# Patient Record
Sex: FEMALE | Race: WHITE | Employment: OTHER | URBAN - METROPOLITAN AREA
[De-identification: names, ages, dates, MRNs, and addresses within clinical notes are randomized per-mention and may not be internally consistent; named-entity substitution may affect disease eponyms.]

---

## 2018-09-18 RX ORDER — TROPICAMIDE 10 MG/ML
1 SOLUTION/ DROPS OPHTHALMIC
Status: CANCELLED | OUTPATIENT
Start: 2018-09-18 | End: 2018-09-18

## 2018-09-18 RX ORDER — ATROPINE SULFATE 10 MG/ML
1 SOLUTION/ DROPS OPHTHALMIC
Status: CANCELLED | OUTPATIENT
Start: 2018-09-18 | End: 2018-09-18

## 2018-09-18 RX ORDER — PHENYLEPHRINE HYDROCHLORIDE 25 MG/ML
1 SOLUTION/ DROPS OPHTHALMIC
Status: CANCELLED | OUTPATIENT
Start: 2018-09-18 | End: 2018-09-18

## 2018-09-24 ENCOUNTER — HOSPITAL ENCOUNTER (OUTPATIENT)
Dept: SURGERY | Age: 78
Discharge: HOME | End: 2018-09-24
Payer: MEDICARE

## 2018-09-24 VITALS
TEMPERATURE: 98.8 F | HEART RATE: 64 BPM | WEIGHT: 173.56 LBS | SYSTOLIC BLOOD PRESSURE: 135 MMHG | OXYGEN SATURATION: 97 % | BODY MASS INDEX: 29.63 KG/M2 | DIASTOLIC BLOOD PRESSURE: 65 MMHG | HEIGHT: 64 IN

## 2018-09-24 LAB — POTASSIUM SERPL-SCNC: 4.5 MMOL/L (ref 3.5–5.1)

## 2018-09-24 PROCEDURE — 84132 ASSAY OF SERUM POTASSIUM: CPT

## 2018-09-24 RX ORDER — LISINOPRIL 20 MG/1
TABLET ORAL DAILY
COMMUNITY

## 2018-09-24 RX ORDER — VERAPAMIL HYDROCHLORIDE 240 MG/1
TABLET, FILM COATED, EXTENDED RELEASE ORAL
COMMUNITY

## 2018-09-24 RX ORDER — HYDROCHLOROTHIAZIDE 12.5 MG/1
12.5 TABLET ORAL DAILY
COMMUNITY

## 2018-09-24 RX ORDER — LEVOTHYROXINE SODIUM 50 UG/1
TABLET ORAL
COMMUNITY

## 2018-09-24 RX ORDER — POLYETHYLENE GLYCOL 3350 17 G/17G
17 POWDER, FOR SOLUTION ORAL DAILY
COMMUNITY

## 2018-09-24 RX ORDER — CLONIDINE HYDROCHLORIDE 0.1 MG/1
TABLET ORAL AS NEEDED
COMMUNITY

## 2018-09-24 RX ORDER — MECLIZINE HYDROCHLORIDE 25 MG/1
25 TABLET ORAL AS NEEDED
COMMUNITY

## 2018-09-24 RX ORDER — GUAIFENESIN 100 MG/5ML
81 LIQUID (ML) ORAL DAILY
COMMUNITY

## 2018-09-24 RX ORDER — OMEPRAZOLE 40 MG/1
40 CAPSULE, DELAYED RELEASE ORAL DAILY
COMMUNITY

## 2018-09-24 RX ORDER — ATORVASTATIN CALCIUM 10 MG/1
TABLET, FILM COATED ORAL DAILY
COMMUNITY

## 2018-09-24 RX ORDER — CARVEDILOL 25 MG/1
25 TABLET ORAL 2 TIMES DAILY WITH MEALS
COMMUNITY

## 2018-09-24 RX ORDER — METRONIDAZOLE 10 MG/G
GEL TOPICAL DAILY
COMMUNITY

## 2018-09-24 NOTE — PERIOP NOTES
Type 2 surgery,  assessment complete. Labs per surgeon: poc potassium 09/24/18 Labs per anesthesia protocol: hgb, poc potassium signed and held for DOS. EKG: not required Pacer rep not required per Dr. Hoffman . Cardiology records including pacer interrogation requested from [facility] cardiology. Non-moisturizing soap and instructions given per hospital policy. Patient provided with and instructed on educational handouts including Guide to Surgery, Pain Management, Hand Hygiene, Blood Transfusion Education, and [place] Anesthesia Brochure. Patient answered medical/surgical history questions at their best of ability. All prior to admission medications documented in [facility]. Patient instructed to hold all vitamins 7 days prior to surgery and NSAIDS 5 days prior to surgery, patient verbalized understanding. Medications to be held: asa - note on chart. Patient instructed to continue previous medications as prescribed prior to surgery and to take the following medications the day of surgery according to anesthesia guidelines with a small sip of water: carvedilol, levothyroxine, omeprazole. Patient instructed on the following: 
Arrive at [facility], time of arrival to be called the day before by 1700 NPO after midnight including gum, mints, and ice chips. Responsible adult must drive patient to the hospital, stay during surgery, and patient will require supervision 24 hours after anesthesia. Use non-moisturizing soap in shower the night before surgery and on the morning of surgery. Leave all valuables (money and jewelry) at home but bring insurance card and ID on       DOS. Do not wear make-up, nail polish, lotions, cologne, perfumes, powders, or oil on skin. Patient teach back successful and patient demonstrates knowledge of instruction.

## 2018-09-24 NOTE — PERIOP NOTES
Type 2 surgery,  assessment complete. Labs per surgeon: none Labs per anesthesia protocol: potassium 09/24/18, hgb and poc potassium signed and held for DOS. EKG: not required Non-moisturizing soap and instructions given per hospital policy. Patient provided with and instructed on educational handouts including Guide to Surgery, Pain Management, Hand Hygiene, Blood Transfusion Education, and [place] Anesthesia Brochure. Patient answered medical/surgical history questions at their best of ability. All prior to admission medications documented in [facility]. Patient instructed to hold all vitamins 7 days prior to surgery and NSAIDS 5 days prior to surgery, patient verbalized understanding. Medications to be held: asa per md instruction- note on chart. Patient instructed to continue previous medications as prescribed prior to surgery and to take the following medications the day of surgery according to anesthesia guidelines with a small sip of water: carvedilol, levothyroxine, omeprazole. Patient instructed on the following: 
Arrive at [facility], time of arrival to be called the day before by 1700 NPO after midnight including gum, mints, and ice chips. Responsible adult must drive patient to the hospital, stay during surgery, and patient will require supervision 24 hours after anesthesia. Use non-moisturizing soap in shower the night before surgery and on the morning of surgery. Leave all valuables (money and jewelry) at home but bring insurance card and ID on       DOS. Do not wear make-up, nail polish, lotions, cologne, perfumes, powders, or oil on skin. Patient teach back successful and patient demonstrates knowledge of instruction.

## 2018-09-25 NOTE — PERIOP NOTES
Last office note and pacer check and ekg and stress test and echo  on chart from [facility] on chart and acceptable per anesthesia protocol

## 2018-09-30 ENCOUNTER — ANESTHESIA EVENT (OUTPATIENT)
Dept: SURGERY | Age: 78
End: 2018-09-30
Payer: MEDICARE

## 2018-10-01 ENCOUNTER — ANESTHESIA (OUTPATIENT)
Dept: SURGERY | Age: 78
End: 2018-10-01
Payer: MEDICARE

## 2018-10-01 ENCOUNTER — HOSPITAL ENCOUNTER (OUTPATIENT)
Age: 78
Setting detail: OUTPATIENT SURGERY
Discharge: HOME | End: 2018-10-01
Attending: OPHTHALMOLOGY | Admitting: OPHTHALMOLOGY
Payer: MEDICARE

## 2018-10-01 VITALS
WEIGHT: 170 LBS | RESPIRATION RATE: 17 BRPM | BODY MASS INDEX: 29.18 KG/M2 | OXYGEN SATURATION: 96 % | DIASTOLIC BLOOD PRESSURE: 95 MMHG | SYSTOLIC BLOOD PRESSURE: 186 MMHG | HEART RATE: 65 BPM | TEMPERATURE: 97.9 F

## 2018-10-01 PROCEDURE — 77030006704 HC BLD OPHTH SLT ALCN -B: Performed by: OPHTHALMOLOGY

## 2018-10-01 PROCEDURE — 77030026083 HC FCPS OPHTH GRSH DSP ALCN -C: Performed by: OPHTHALMOLOGY

## 2018-10-01 PROCEDURE — 77030011291 HC ERAS HEMSTAT BVTC -A: Performed by: OPHTHALMOLOGY

## 2018-10-01 PROCEDURE — 74011000250 HC RX REV CODE- 250: Performed by: OPHTHALMOLOGY

## 2018-10-01 PROCEDURE — 77030002975 HC SUT PLN J&J -B: Performed by: OPHTHALMOLOGY

## 2018-10-01 PROCEDURE — 74011250636 HC RX REV CODE- 250/636: Performed by: ANESTHESIOLOGY

## 2018-10-01 PROCEDURE — 74011250636 HC RX REV CODE- 250/636: Performed by: OPHTHALMOLOGY

## 2018-10-01 PROCEDURE — 77030018846 HC SOL IRR STRL H20 ICUM -A: Performed by: OPHTHALMOLOGY

## 2018-10-01 PROCEDURE — 77030020272 HC MISC IMPL LENS: Performed by: OPHTHALMOLOGY

## 2018-10-01 PROCEDURE — 76010000131 HC OR TIME 2 TO 2.5 HR: Performed by: OPHTHALMOLOGY

## 2018-10-01 PROCEDURE — 74011250636 HC RX REV CODE- 250/636

## 2018-10-01 PROCEDURE — 77030018838 HC SOL IRR OPTH ALCN -B: Performed by: OPHTHALMOLOGY

## 2018-10-01 PROCEDURE — 77030008547 HC TBNG OPHTH BD -A: Performed by: OPHTHALMOLOGY

## 2018-10-01 PROCEDURE — 77030029764 HC VLV ENTRY OPHTH SYS ALCN -C: Performed by: OPHTHALMOLOGY

## 2018-10-01 PROCEDURE — 77030013673 HC FCPS OPTH ALCN -C: Performed by: OPHTHALMOLOGY

## 2018-10-01 PROCEDURE — 76210000020 HC REC RM PH II FIRST 0.5 HR: Performed by: OPHTHALMOLOGY

## 2018-10-01 PROCEDURE — 77030026916 HC ERAS HEMSTAT BVTC -B: Performed by: OPHTHALMOLOGY

## 2018-10-01 PROCEDURE — 77030006694 HC BLD OPHTH CRESC ALCN -B: Performed by: OPHTHALMOLOGY

## 2018-10-01 PROCEDURE — 77030002917 HC SUT ETHLN J&J -B: Performed by: OPHTHALMOLOGY

## 2018-10-01 PROCEDURE — 76210000063 HC OR PH I REC FIRST 0.5 HR: Performed by: OPHTHALMOLOGY

## 2018-10-01 PROCEDURE — 77030030827 HC RETRCTR IRIS DISP ALCN -C: Performed by: OPHTHALMOLOGY

## 2018-10-01 PROCEDURE — 77030003029 HC SUT VCRL J&J -B: Performed by: OPHTHALMOLOGY

## 2018-10-01 PROCEDURE — 76060000036 HC ANESTHESIA 2.5 TO 3 HR: Performed by: OPHTHALMOLOGY

## 2018-10-01 RX ORDER — SODIUM CHLORIDE, SODIUM LACTATE, POTASSIUM CHLORIDE, CALCIUM CHLORIDE 600; 310; 30; 20 MG/100ML; MG/100ML; MG/100ML; MG/100ML
100 INJECTION, SOLUTION INTRAVENOUS CONTINUOUS
Status: DISCONTINUED | OUTPATIENT
Start: 2018-10-01 | End: 2018-10-01 | Stop reason: HOSPADM

## 2018-10-01 RX ORDER — SODIUM CHLORIDE 9 MG/ML
INJECTION INTRAMUSCULAR; INTRAVENOUS; SUBCUTANEOUS AS NEEDED
Status: DISCONTINUED | OUTPATIENT
Start: 2018-10-01 | End: 2018-10-01 | Stop reason: HOSPADM

## 2018-10-01 RX ORDER — ATROPINE SULFATE 10 MG/ML
1 SOLUTION/ DROPS OPHTHALMIC
Status: COMPLETED | OUTPATIENT
Start: 2018-10-01 | End: 2018-10-01

## 2018-10-01 RX ORDER — MIDAZOLAM HYDROCHLORIDE 1 MG/ML
INJECTION, SOLUTION INTRAMUSCULAR; INTRAVENOUS AS NEEDED
Status: DISCONTINUED | OUTPATIENT
Start: 2018-10-01 | End: 2018-10-01 | Stop reason: HOSPADM

## 2018-10-01 RX ORDER — SODIUM CHLORIDE, SODIUM LACTATE, POTASSIUM CHLORIDE, CALCIUM CHLORIDE 600; 310; 30; 20 MG/100ML; MG/100ML; MG/100ML; MG/100ML
75 INJECTION, SOLUTION INTRAVENOUS CONTINUOUS
Status: DISCONTINUED | OUTPATIENT
Start: 2018-10-01 | End: 2018-10-01 | Stop reason: HOSPADM

## 2018-10-01 RX ORDER — SODIUM CHLORIDE 0.9 % (FLUSH) 0.9 %
5-10 SYRINGE (ML) INJECTION EVERY 8 HOURS
Status: DISCONTINUED | OUTPATIENT
Start: 2018-10-01 | End: 2018-10-01 | Stop reason: HOSPADM

## 2018-10-01 RX ORDER — SODIUM CHLORIDE 0.9 % (FLUSH) 0.9 %
5-10 SYRINGE (ML) INJECTION AS NEEDED
Status: DISCONTINUED | OUTPATIENT
Start: 2018-10-01 | End: 2018-10-01 | Stop reason: HOSPADM

## 2018-10-01 RX ORDER — LIDOCAINE HYDROCHLORIDE 10 MG/ML
0.1 INJECTION INFILTRATION; PERINEURAL AS NEEDED
Status: DISCONTINUED | OUTPATIENT
Start: 2018-10-01 | End: 2018-10-01 | Stop reason: HOSPADM

## 2018-10-01 RX ORDER — DIPHENHYDRAMINE HYDROCHLORIDE 50 MG/ML
12.5 INJECTION, SOLUTION INTRAMUSCULAR; INTRAVENOUS
Status: DISCONTINUED | OUTPATIENT
Start: 2018-10-01 | End: 2018-10-01 | Stop reason: HOSPADM

## 2018-10-01 RX ORDER — BUPIVACAINE HYDROCHLORIDE 7.5 MG/ML
INJECTION, SOLUTION EPIDURAL; RETROBULBAR AS NEEDED
Status: DISCONTINUED | OUTPATIENT
Start: 2018-10-01 | End: 2018-10-01 | Stop reason: HOSPADM

## 2018-10-01 RX ORDER — TROPICAMIDE 10 MG/ML
1 SOLUTION/ DROPS OPHTHALMIC
Status: COMPLETED | OUTPATIENT
Start: 2018-10-01 | End: 2018-10-01

## 2018-10-01 RX ORDER — FLUMAZENIL 0.1 MG/ML
0.2 INJECTION INTRAVENOUS
Status: DISCONTINUED | OUTPATIENT
Start: 2018-10-01 | End: 2018-10-01 | Stop reason: HOSPADM

## 2018-10-01 RX ORDER — NALOXONE HYDROCHLORIDE 0.4 MG/ML
0.1 INJECTION, SOLUTION INTRAMUSCULAR; INTRAVENOUS; SUBCUTANEOUS
Status: DISCONTINUED | OUTPATIENT
Start: 2018-10-01 | End: 2018-10-01 | Stop reason: HOSPADM

## 2018-10-01 RX ORDER — FENTANYL CITRATE 50 UG/ML
INJECTION, SOLUTION INTRAMUSCULAR; INTRAVENOUS AS NEEDED
Status: DISCONTINUED | OUTPATIENT
Start: 2018-10-01 | End: 2018-10-01 | Stop reason: HOSPADM

## 2018-10-01 RX ORDER — PHENYLEPHRINE HYDROCHLORIDE 25 MG/ML
1 SOLUTION/ DROPS OPHTHALMIC
Status: COMPLETED | OUTPATIENT
Start: 2018-10-01 | End: 2018-10-01

## 2018-10-01 RX ORDER — OXYCODONE HYDROCHLORIDE 5 MG/1
5 TABLET ORAL
Status: DISCONTINUED | OUTPATIENT
Start: 2018-10-01 | End: 2018-10-01 | Stop reason: HOSPADM

## 2018-10-01 RX ORDER — CEFAZOLIN SODIUM 1 G/3ML
INJECTION, POWDER, FOR SOLUTION INTRAMUSCULAR; INTRAVENOUS AS NEEDED
Status: DISCONTINUED | OUTPATIENT
Start: 2018-10-01 | End: 2018-10-01 | Stop reason: HOSPADM

## 2018-10-01 RX ORDER — LIDOCAINE HYDROCHLORIDE 20 MG/ML
INJECTION, SOLUTION INFILTRATION; PERINEURAL AS NEEDED
Status: DISCONTINUED | OUTPATIENT
Start: 2018-10-01 | End: 2018-10-01 | Stop reason: HOSPADM

## 2018-10-01 RX ORDER — BETAMETHASONE SODIUM PHOSPHATE AND BETAMETHASONE ACETATE 3; 3 MG/ML; MG/ML
INJECTION, SUSPENSION INTRA-ARTICULAR; INTRALESIONAL; INTRAMUSCULAR; SOFT TISSUE AS NEEDED
Status: DISCONTINUED | OUTPATIENT
Start: 2018-10-01 | End: 2018-10-01 | Stop reason: HOSPADM

## 2018-10-01 RX ORDER — HYDROMORPHONE HYDROCHLORIDE 2 MG/ML
0.5 INJECTION, SOLUTION INTRAMUSCULAR; INTRAVENOUS; SUBCUTANEOUS
Status: DISCONTINUED | OUTPATIENT
Start: 2018-10-01 | End: 2018-10-01 | Stop reason: HOSPADM

## 2018-10-01 RX ORDER — TETRACAINE HYDROCHLORIDE 5 MG/ML
SOLUTION OPHTHALMIC AS NEEDED
Status: DISCONTINUED | OUTPATIENT
Start: 2018-10-01 | End: 2018-10-01 | Stop reason: HOSPADM

## 2018-10-01 RX ADMIN — TROPICAMIDE 1 DROP: 10 SOLUTION/ DROPS OPHTHALMIC at 12:15

## 2018-10-01 RX ADMIN — MIDAZOLAM HYDROCHLORIDE 0.5 MG: 1 INJECTION, SOLUTION INTRAMUSCULAR; INTRAVENOUS at 13:17

## 2018-10-01 RX ADMIN — PHENYLEPHRINE HYDROCHLORIDE 1 DROP: 25 SOLUTION/ DROPS OPHTHALMIC at 12:20

## 2018-10-01 RX ADMIN — ATROPINE SULFATE 1 DROP: 10 SOLUTION OPHTHALMIC at 12:10

## 2018-10-01 RX ADMIN — MIDAZOLAM HYDROCHLORIDE 0.5 MG: 1 INJECTION, SOLUTION INTRAMUSCULAR; INTRAVENOUS at 14:28

## 2018-10-01 RX ADMIN — TROPICAMIDE 1 DROP: 10 SOLUTION/ DROPS OPHTHALMIC at 12:20

## 2018-10-01 RX ADMIN — PHENYLEPHRINE HYDROCHLORIDE 1 DROP: 25 SOLUTION/ DROPS OPHTHALMIC at 12:10

## 2018-10-01 RX ADMIN — MIDAZOLAM HYDROCHLORIDE 1 MG: 1 INJECTION, SOLUTION INTRAMUSCULAR; INTRAVENOUS at 12:57

## 2018-10-01 RX ADMIN — FENTANYL CITRATE 12.5 MCG: 50 INJECTION, SOLUTION INTRAMUSCULAR; INTRAVENOUS at 15:05

## 2018-10-01 RX ADMIN — SODIUM CHLORIDE, SODIUM LACTATE, POTASSIUM CHLORIDE, AND CALCIUM CHLORIDE 100 ML/HR: 600; 310; 30; 20 INJECTION, SOLUTION INTRAVENOUS at 12:17

## 2018-10-01 RX ADMIN — FENTANYL CITRATE 12.5 MCG: 50 INJECTION, SOLUTION INTRAMUSCULAR; INTRAVENOUS at 14:01

## 2018-10-01 RX ADMIN — ATROPINE SULFATE 1 DROP: 10 SOLUTION OPHTHALMIC at 12:15

## 2018-10-01 RX ADMIN — FENTANYL CITRATE 50 MCG: 50 INJECTION, SOLUTION INTRAMUSCULAR; INTRAVENOUS at 12:57

## 2018-10-01 RX ADMIN — PHENYLEPHRINE HYDROCHLORIDE 1 DROP: 25 SOLUTION/ DROPS OPHTHALMIC at 12:15

## 2018-10-01 RX ADMIN — TROPICAMIDE 1 DROP: 10 SOLUTION/ DROPS OPHTHALMIC at 12:10

## 2018-10-01 NOTE — PROGRESS NOTES
's pre-op visit and prayer with patient as requested. Martha Shipman MDiv, BS Board Certified [employer]

## 2018-10-01 NOTE — IP AVS SNAPSHOT
[facility] 
 
 
 [address] 
[phone] Patient: Chao Douglas MRN: [id number] :1940 About your hospitalization You were admitted on:  2018 You last received care in the:  [facility] OP PACU You were discharged on:  2018 Why you were hospitalized Your primary diagnosis was:  Not on File Follow-up Information Follow up With Details Comments Contact Info Tennille Quintero, [address] [place], [place] [id number] [phone] Discharge Orders None A check kaushik indicates which time of day the medication should be taken. My Medications ASK your doctor about these medications Instructions Each Dose to Equal  
 Morning Noon Evening Bedtime  
 aspirin 81 mg chewable tablet Your last dose was: Your next dose is: Take 81 mg by mouth daily. 81 mg  
    
   
   
   
  
 atorvastatin 10 mg tablet Commonly known as:  LIPITOR Your last dose was: Your next dose is: Take  by mouth daily. Indications: pm  
     
   
   
   
  
 AZELEX 20 % topical cream  
Generic drug:  azelaic acid Your last dose was: Your next dose is:    
   
   
 Apply  to affected area two (2) times a day. carvedilol 25 mg tablet Commonly known as:  Kristie Daniella Your last dose was: Your next dose is: Take 25 mg by mouth two (2) times daily (with meals). 25 mg  
    
   
   
   
  
 cloNIDine HCl 0.1 mg tablet Commonly known as:  CATAPRES Your last dose was: Your next dose is: Take  by mouth as needed. Take if blood pressure over 200. hydroCHLOROthiazide 12.5 mg tablet Commonly known as:  HYDRODIURIL Your last dose was: Your next dose is: Take 12.5 mg by mouth daily.   
 12.5 mg  
    
   
   
 levothyroxine 50 mcg tablet Commonly known as:  SYNTHROID Your last dose was: Your next dose is: Take  by mouth Daily (before breakfast). lisinopril 20 mg tablet Commonly known as:  Durward Kinds Your last dose was: Your next dose is: Take  by mouth daily. meclizine 25 mg tablet Commonly known as:  ANTIVERT Your last dose was: Your next dose is: Take 25 mg by mouth as needed. 25 mg  
    
   
   
   
  
 metroNIDAZOLE 1 % topical gel Commonly known as:  Leticia Maiden Your last dose was: Your next dose is:    
   
   
 Apply  to affected area daily. Use a thin layer to affected areas after washing  
     
   
   
   
  
 omeprazole 40 mg capsule Commonly known as:  PRILOSEC Your last dose was: Your next dose is: Take 40 mg by mouth daily. 40 mg  
    
   
   
   
  
 polyethylene glycol 17 gram packet Commonly known as:  Myla Howe Your last dose was: Your next dose is: Take 17 g by mouth daily. 17 g  
    
   
   
   
  
 verapamil  mg CR tablet Commonly known as:  CALAN-SR Your last dose was: Your next dose is: Take  by mouth nightly. XALATAN 0.005 % ophthalmic solution Generic drug:  latanoprost  
   
Your last dose was: Your next dose is:    
   
   
 Administer 1 Drop to both eyes nightly. 1 Drop Discharge Instructions ACTIVITY · As tolerated and as directed by your doctor. · Bathe or shower as directed by your doctor. DIET · Clear liquids until no nausea or vomiting; then light diet for the first day. · Advance to regular diet on second day, unless your doctor orders otherwise. · If nausea and vomiting continues, call your doctor.   
 
PAIN 
 · Take pain medication as directed by your doctor. · Call your doctor if pain is NOT relieved by medication. · DO NOT take aspirin of blood thinners unless directed by your doctor. DRESSING CARE  
 
 
CALL YOUR DOCTOR IF  
· Excessive bleeding that does not stop after holding pressure over the area · Temperature of 101 degrees F or above · Excessive redness, swelling or bruising, and/ or green or yellow, smelly discharge from incision AFTER ANESTHESIA · For the first 24 hours: DO NOT Drive, Drink alcoholic beverages, or Make important decisions. · Be aware of dizziness following anesthesia and while taking pain medication. APPOINTMENT DATE/ TIME 
 
YOUR DOCTOR'S PHONE NUMBER  
 
 
DISCHARGE SUMMARY from Nurse PATIENT INSTRUCTIONS: 
 
After general anesthesia or intravenous sedation, for 24 hours or while taking prescription Narcotics: · Limit your activities · Do not drive and operate hazardous machinery · Do not make important personal or business decisions · Do  not drink alcoholic beverages · If you have not urinated within 8 hours after discharge, please contact your surgeon on call. *  Please give a list of your current medications to your Primary Care Provider. *  Please update this list whenever your medications are discontinued, doses are 
    changed, or new medications (including over-the-counter products) are added. *  Please carry medication information at all times in case of emergency situations. These are general instructions for a healthy lifestyle: No smoking/ No tobacco products/ Avoid exposure to second hand smoke Surgeon General's Warning:  Quitting smoking now greatly reduces serious risk to your health. Obesity, smoking, and sedentary lifestyle greatly increases your risk for illness A healthy diet, regular physical exercise & weight monitoring are important for maintaining a healthy lifestyle You may be retaining fluid if you have a history of heart failure or if you experience any of the following symptoms:  Weight gain of 3 pounds or more overnight or 5 pounds in a week, increased swelling in our hands or feet or shortness of breath while lying flat in bed. Please call your doctor as soon as you notice any of these symptoms; do not wait until your next office visit. Recognize signs and symptoms of STROKE: 
 
F-face looks uneven A-arms unable to move or move unevenly S-speech slurred or non-existent T-time-call 911 as soon as signs and symptoms begin-DO NOT go Back to bed or wait to see if you get better-TIME IS BRAIN. Eye instructions copied and given to pt. appt . In the morning Introducing [facility] & HEALTH SERVICES! [employer] introduces [employer] patient portal. Now you can access parts of your medical record, email your doctor's office, and request medication refills online. 1. In your internet browser, go to [email or web address] 2. Click on the First Time User? Click Here link in the Sign In box. You will see the New Member Sign Up page. 3. Enter your [employer] Access Code exactly as it appears below. You will not need to use this code after youve completed the sign-up process. If you do not sign up before the expiration date, you must request a new code. · [employer] Access Code: [email or web address] Expires: 12/10/2018  8:21 AM 
 
4. Enter the last four digits of your Social Security Number (xxxx) and Date of Birth (mm/dd/yyyy) as indicated and click Submit. You will be taken to the next sign-up page. 5. Create a [employer] ID. This will be your [employer] login ID and cannot be changed, so think of one that is secure and easy to remember. 6. Create a [employer] password. You can change your password at any time. 7. Enter your Password Reset Question and Answer. This can be used at a later time if you forget your password. 8. Enter your e-mail address. You will receive e-mail notification when new information is available in [address] 9. Click Sign Up. You can now view and download portions of your medical record. 10. Click the Download Summary menu link to download a portable copy of your medical information. If you have questions, please visit the Frequently Asked Questions section of the [employer] website. Remember, [employer] is NOT to be used for urgent needs. For medical emergencies, dial 911. Now available from your iPhone and Android! Introducing Christopher Ch As a [employer] patient, I wanted to make you aware of our electronic visit tool called [employer]. [employer] 24/7 allows you to connect within minutes with a medical provider 24 hours a day, seven days a week via a mobile device or tablet or logging into a secure website from your computer. You can access Christopher Ch from anywhere in the [place]. A virtual visit might be right for you when you have a simple condition and feel like you just dont want to get out of bed, or cant get away from work for an appointment, when your regular [employer] provider is not available (evenings, weekends or holidays), or when youre out of town and need minor care. Electronic visits cost only $49 and if the [employer] 24/7 provider determines a prescription is needed to treat your condition, one can be electronically transmitted to a nearby pharmacy*. Please take a moment to enroll today if you have not already done so. The enrollment process is free and takes just a few minutes. To enroll, please download the [employer] 24/7 coby to your tablet or phone, or visit [email or web address] to enroll on your computer.    
And, as an [address] [place] [address] patient with a [employer] account, the results of your visits will be scanned into your electronic medical record and your primary care provider will be able to view the scanned results. We urge you to continue to see your regular Charli Ronquillo provider for your ongoing medical care. And while your primary care provider may not be the one available when you seek a [employer] virtual visit, the peace of mind you get from getting a real diagnosis real time can be priceless. For more information on [employer], view our Frequently Asked Questions (FAQs) at [email or web address]. Sincerely, 
 
Tierney Corbett MD 
Chief Medical Officer [facility] Lisette De La Garza *:  certain medications cannot be prescribed via [employer] Providers Seen During Your Hospitalization Provider Specialty Primary office phone Leigh Ann Brady MD Ophthalmology [phone] Your Primary Care Physician (PCP) Primary Care Physician Office Phone Office Fax NOT ON FILE ** None ** ** None ** You are allergic to the following Allergen Reactions Chicken Derived Diarrhea Recent Documentation Weight BMI OB Status Smoking Status 77.1 kg 29.18 kg/m2 Hysterectomy Never Smoker Emergency Contacts Name Discharge Info Relation Home Work Mobile Katina Bunn DISCHARGE CAREGIVER [3] Other Relative [6] [phone] Patient Belongings The following personal items are in your possession at time of discharge: 
  Dental Appliances: None  Visual Aid: None          Jewelry: None  Clothing: Undergarments, Footwear, Shirt, Pants Please provide this summary of care documentation to your next provider. Signatures-by signing, you are acknowledging that this After Visit Summary has been reviewed with you and you have received a copy. Patient Signature:  ____________________________________________________________ Date:  ____________________________________________________________  
  
Gilbert Kohler  Provider Signature: ____________________________________________________________ Date:  ____________________________________________________________

## 2018-10-01 NOTE — ANESTHESIA POSTPROCEDURE EVALUATION
Post-Anesthesia Evaluation and Assessment Patient: Sanaz Dean MRN: [id number]  SSN: [id number] YOB: 1940  Age: 69 y.o. Sex: female Cardiovascular Function/Vital Signs Visit Vitals  /88  Pulse 61  Temp 36.6 °C (97.9 °F)  Resp 16  Wt 77.1 kg (170 lb)  SpO2 98%  BMI 29.18 kg/m2 Patient is status post MAC anesthesia for Procedure(s): RIGHT VITRECTOMY POSTERIOR 23 GAUGE REMOVAL NONMAGNETIC FOREIGN BODY 
RIGHT INTRA OCULAR LENS EXCHANGE WITH IRIS SUTURING . Nausea/Vomiting: None Postoperative hydration reviewed and adequate. Pain: 
Pain Scale 1: Numeric (0 - 10) (10/01/18 1525) Pain Intensity 1: 0 (10/01/18 1525) Managed Neurological Status:  
Neuro (WDL): Within Defined Limits (10/01/18 1525) At baseline Mental Status and Level of Consciousness: Arousable Pulmonary Status:  
O2 Device: Room air (10/01/18 1525) Adequate oxygenation and airway patent Complications related to anesthesia: None Post-anesthesia assessment completed. No concerns Signed By: Fidel Meza MD   
 October 1, 2018

## 2018-10-01 NOTE — ANESTHESIA PREPROCEDURE EVALUATION
Anesthetic History PONV Review of Systems / Medical History Patient summary reviewed and pertinent labs reviewed Pulmonary Sleep apnea: CPAP Neuro/Psych Psychiatric history (Anxiety) Cardiovascular Hypertension: well controlled Pacemaker (Pacer for SSS) Exercise tolerance: >4 METS Comments: Denies recent CP, SOB or Palpitations Stress Test 3/2018 negative for ischemia. Echo 3/2018: EF 55-65%, mild AI, mild PI and mild MR.  
GI/Hepatic/Renal 
  
GERD: well controlled Hiatal hernia Endo/Other Obesity and arthritis Other Findings Physical Exam 
 
Airway Mallampati: III 
TM Distance: 4 - 6 cm Neck ROM: normal range of motion Mouth opening: Normal 
 
 Cardiovascular Regular rate and rhythm,  S1 and S2 normal,  no murmur, click, rub, or gallop Dental 
No notable dental hx Pulmonary Breath sounds clear to auscultation Abdominal 
GI exam deferred Other Findings Anesthetic Plan ASA: 3 Anesthesia type: MAC Induction: Intravenous Anesthetic plan and risks discussed with: Patient

## 2018-10-01 NOTE — DISCHARGE INSTRUCTIONS
ACTIVITY  · As tolerated and as directed by your doctor. · Bathe or shower as directed by your doctor. DIET  · Clear liquids until no nausea or vomiting; then light diet for the first day. · Advance to regular diet on second day, unless your doctor orders otherwise. · If nausea and vomiting continues, call your doctor. PAIN  · Take pain medication as directed by your doctor. · Call your doctor if pain is NOT relieved by medication. · DO NOT take aspirin of blood thinners unless directed by your doctor. DRESSING CARE       CALL YOUR DOCTOR IF   · Excessive bleeding that does not stop after holding pressure over the area  · Temperature of 101 degrees F or above  · Excessive redness, swelling or bruising, and/ or green or yellow, smelly discharge from incision    AFTER ANESTHESIA   · For the first 24 hours: DO NOT Drive, Drink alcoholic beverages, or Make important decisions. · Be aware of dizziness following anesthesia and while taking pain medication. APPOINTMENT DATE/ TIME    YOUR DOCTOR'S PHONE NUMBER       DISCHARGE SUMMARY from Nurse    PATIENT INSTRUCTIONS:    After general anesthesia or intravenous sedation, for 24 hours or while taking prescription Narcotics:  · Limit your activities  · Do not drive and operate hazardous machinery  · Do not make important personal or business decisions  · Do  not drink alcoholic beverages  · If you have not urinated within 8 hours after discharge, please contact your surgeon on call. *  Please give a list of your current medications to your Primary Care Provider. *  Please update this list whenever your medications are discontinued, doses are      changed, or new medications (including over-the-counter products) are added. *  Please carry medication information at all times in case of emergency situations.       These are general instructions for a healthy lifestyle:    No smoking/ No tobacco products/ Avoid exposure to second hand smoke    Surgeon General's Warning:  Quitting smoking now greatly reduces serious risk to your health. Obesity, smoking, and sedentary lifestyle greatly increases your risk for illness    A healthy diet, regular physical exercise & weight monitoring are important for maintaining a healthy lifestyle    You may be retaining fluid if you have a history of heart failure or if you experience any of the following symptoms:  Weight gain of 3 pounds or more overnight or 5 pounds in a week, increased swelling in our hands or feet or shortness of breath while lying flat in bed. Please call your doctor as soon as you notice any of these symptoms; do not wait until your next office visit. Recognize signs and symptoms of STROKE:    F-face looks uneven    A-arms unable to move or move unevenly    S-speech slurred or non-existent    T-time-call 911 as soon as signs and symptoms begin-DO NOT go       Back to bed or wait to see if you get better-TIME IS BRAIN. Eye instructions copied and given to pt. appt .  In the morning

## 2018-10-01 NOTE — OP NOTES
[facility] REPORT    Napoleon Murray  MR#: [id number]  : 1940  ACCOUNT #: [de-identified]   DATE OF SERVICE: 10/01/2018    PREOPERATIVE DIAGNOSIS:  Dislocated intraocular lens, right eye. POSTOPERATIVE DIAGNOSIS:  Dislocated intraocular lens, right eye. OPERATIVE PROCEDURE: Pars plana vitrectomy with IOL removal and secondary scleral fixated IOL, right eye. SURGEON:  Karma Ogden MD.    ASSISTANT: Damaris Stratton    ANESTHESIA:  Monitored anesthesia care with retrobulbar block. COMPLICATIONS:  None. ESTIMATED BLOOD LOSS:  None    SPECIMENS REMOVED:  None    IMPLANTS:  Akreos +12.00 D lens    INDICATIONS:  This is a 23-year-old female who has had a dislocated IOL in the right eye for 1 year. She has a 1 piece PMMA lens dislocated into the vitreous cavity. There appeared to be capsule support temporally. Treatment options were discussed with the patient to include repositioning of the IOL, IOL removal with placement of a secondary IOL. The benefits, risks and alternatives were discussed with the patient. She elected to proceed with surgery. TECHNIQUE IN DETAIL:  The patient was brought to the operating room and placed in the supine position. Retrobulbar anesthesia was administered using an equal mixture of 2% lidocaine and 0.75% Marcaine. A total of 5 mL was injected. The right eye was prepped and draped in usual sterile fashion. Conjunctivae peritomies were made using 0.12 forceps and blunt Ino scissors. Hemostasis was achieved using electrocautery. An infusion cannula was placed in the inferior temporal quadrant. The tip of the infusion line was visualized in the vitreous cavity and infusion was started. The cornea was marked with a corneal marker. The 4 sclerotomy sites were marked 2 mm posterior to the limbus and 4 mm apart. There were 2 sclerotomy sites on each side of the eye.   A crescent blade was used to make a superior temporal limbal wound. The anterior chamber was not entered at this time. Trocars and cannulas were placed through the 2 marked superior sclerotomies. Under visualization of the Resight, a core vitrectomy was performed. The posterior hyaloid was already  from the retina. The intraocular lens was dislocated on the retinal surface. The peripheral gel was shaved 360 degrees. The peripheral retina was carefully inspected. There were no retinal breaks. The 25-gauge ILM forceps were used to grasp the intraocular lens off the surface of the retina. The lens was elevated and placed in the ciliary sulcus using a combination of the 23-gauge ILM forceps and a Romeo pusher. The lens appeared stable in the sulcus. I rotated the lens so that there would be sufficient capsule support. As the lens was being repositioned, it dislocated back into the vitreous cavity. As I retrieved the lens for the second time, one of the haptics broke off of the lens. It was obvious that the lens was going to have to be explanted. Since there was not sufficient capsule support nasally, the decision was made to explant the lens and place, an B&L Akreos lens with suture fixation to the sclera. The haptic was passed into the anterior chamber. The wound was opened and the haptic was explanted. Under visualization of the Resight, the remaining lens optic and haptic were elevated into the anterior chamber using the forceps and the light pipe. The lens was explanted. The vitrector was used to remove the majority of the remaining lens capsule. A 10-0 nylon was placed through the limbal wound. A Bausch and Lomb Akreos +12 diopter lens was prepared for implantation. A CV-8 Upland-Jg suture was passed through the eyelets on the intraocular lens. The Upland-Jg sutures were passed into the anterior chamber and externalized through the appropriate sclerotomies. The limbal wound suture was removed.   The lens was inserted through the limbal wound through the anterior chamber into the ciliary sulcus. The [place]-Jg sutures were cinched as the lens was implanted centering the lens in the visual axis behind the iris. The superior cannulas were removed. The limbal wound was closed using 3 interrupted 10-0 nylon sutures. All 3 knots were rotated and buried. The [place]-Jg suture was tied permanently. The knot was rotated into the superior sclerotomies and buried with an angled Carlee forceps. The conjunctiva was closed using interrupted 6-0 plain gut suture. The infusion cannula was removed and that wound was closed with an 8-0 Vicryl suture. The conjunctiva was closed on the temporal side with a 6-0 plain gut sutures. Subconjunctival Ancef and triamcinolone were injected. Maxitrol ointment was placed in the eye and the eye was patched and shielded. The patient was then taken to recovery room in stable condition.       MD RADHA Phillips /   D: 10/01/2018 15:44     T: 10/01/2018 16:23  JOB #: [id number]

## 2020-08-27 ENCOUNTER — HOSPITAL ENCOUNTER (OUTPATIENT)
Dept: LAB | Age: 80
Discharge: HOME | End: 2020-08-27

## 2020-08-27 PROCEDURE — 88305 TISSUE EXAM BY PATHOLOGIST: CPT

## 2020-12-04 ENCOUNTER — HOSPITAL ENCOUNTER (OUTPATIENT)
Dept: PHYSICAL THERAPY | Age: 80
Discharge: HOME | End: 2020-12-04
Payer: MEDICARE

## 2020-12-04 PROCEDURE — 92610 EVALUATE SWALLOWING FUNCTION: CPT | Performed by: SPEECH-LANGUAGE PATHOLOGIST

## 2020-12-04 NOTE — THERAPY EVALUATION
Zahida Salcedo  : 1940  [place]  Secondary: [address] [facility] at [place] [id number] [facility], [address] [place] [address],[address], [place] [address].  [phone]   [phone]         OUTPATIENT SPEECH LANGUAGE PATHOLOGY: Initial Assessment  ICD-10: Treatment Diagnosis: Oropharyngeal Dysphagia R13.12  REFERRING PHYSICIAN: Letty Yanez MD MD Orders: Evaluate and Treat  PAST MEDICAL HISTORY:   Ms. Jung Wang is a 79 y.o. female who  has a past medical history of Anxiety, Arthritis, Bradycardia, Cancer ([address] Chronic obstructive pulmonary disease ([address] Chronic pain, GERD (gastroesophageal reflux disease), Hematuria, Hiatal hernia, Hypertension, Nausea & vomiting, Psychiatric disorder, Sleep apnea, and Thyroid disease. She also  has a past surgical history that includes hx pacemaker (); hx hysterectomy (); pr breast surgery procedure unlisted (); hx heent (); hx cataract removal (Right, ); hx cataract removal (Left, ); and hx tonsillectomy (). MEDICAL/REFERRING DIAGNOSIS: speech  DATE OF ONSET: several years ago  PRIOR LEVEL OF FUNCTION: Independent with ADL'S  PRECAUTIONS/ALLERGIES: Chicken derived     ASSESSMENT:  Patient is a 79year old female who was referred for Dysphagia evaluation due to difficulty swallowing. She reports that she's been having difficulty swallowing for several years. Patient recently had a barium test which suggested some esophageal issues. She had recent EGD done due to esophageal spasms. She did not notice any changes in her swallowing after this procedure. Her trigger foods are bread, corn bread and drink after each bite of food. She's had about 20 pound weight loss in the last 4 months. She does have GERD and currently Omeprazole but she has not seen much difference taking this. She does sleep apnea in which she wears a CPAP.  She does have essential tremor and being followed by physician at [facility]. Denies CVA or TIA. Patient recently had a barium swallow which showed some issues with her esophageal muscles in addition she had moderate valleculae residue has well. No aspiration was observed on study. Based on the objective data described below, the patient presents with minimal clinical s/sx of Dysphagia. Oral motor exam was WNL's for patient's age. She was given trials of thin liquids via cup, puree, mixed and solids. No overt clinical s/sx of aspiration was observed with any trials. Double swallow observed with puree, mixed and solids. ST recommends patient participate in MBSS to further assess swallow function for proper recommendations and or strategies. ST will ask referring MD for order. ST recommends continuing with current diet until completion of MBSS  Patient will benefit from skilled intervention to address the below impairments. ?????? ? ? This section established at most recent assessment??????????  PROBLEM LIST (Impairments causing functional limitations):  1. Dysphagia   GOALS: (Goals have been discussed and agreed upon with patient.)  SHORT-TERM FUNCTIONAL GOALS: Time Frame: 6 weeks  Patient will participate in MBSS with 100% participation. Patient will complete laryngeal strengthening exercises to reduce/eliminate clinical s/sx of aspiration. DISCHARGE GOALS: Time Frame: 1 month  1. Patient will tolerate least restrictive diet without overt clinical s/sx of aspiration or airway compromise. REHABILITATION POTENTIAL FOR STATED GOALS: FairPLAN OF CARE:  Patient will benefit from skilled intervention to address the following impairments.   RECOMMENDATIONS AND PLANNED INTERVENTIONS (Benefits and precautions of therapy have been discussed with the patient.):  · continue prescribed diet  MEDICATIONS:  · With liquid  COMPENSATORY STRATEGIES/MODIFICATIONS INCLUDING:  · None  OTHER RECOMMENDATIONS (including follow up treatment recommendations):   · Laryngeal exercises  · Patient education  · MBSS  RECOMMENDED DIET MODIFICATIONS DISCUSSED WITH:  · Family  · Patient  TREATMENT PLAN EFFECTIVE DATES: 12/4/2020 TO 1/5/2021 (30 days). FREQUENCY/DURATION: Continue to follow patient 1 time a week for 30 days to address above goals. Regarding Evie Bunn's therapy, I certify that the treatment plan above will be carried out by a therapist or under their direction. Thank you for this referral,  AYSHA Montgomery Ed CCC-SLP'                Referring Physician Signature: Mick Reyes MD    Date      SUBJECTIVE:  Alert and pleasant   Present Symptoms:      Current Medications:   Current Outpatient Medications on File Prior to Encounter   Medication Sig Dispense Refill    verapamil ER (CALAN-SR) 240 mg CR tablet Take  by mouth nightly.  lisinopril (PRINIVIL, ZESTRIL) 20 mg tablet Take  by mouth daily.  levothyroxine (SYNTHROID) 50 mcg tablet Take  by mouth Daily (before breakfast).  omeprazole (PRILOSEC) 40 mg capsule Take 40 mg by mouth daily.  metroNIDAZOLE (METROGEL) 1 % topical gel Apply  to affected area daily. Use a thin layer to affected areas after washing      cloNIDine HCl (CATAPRES) 0.1 mg tablet Take  by mouth as needed. Take if blood pressure over 200.  aspirin 81 mg chewable tablet Take 81 mg by mouth daily.  atorvastatin (LIPITOR) 10 mg tablet Take  by mouth daily. Indications: pm      meclizine (ANTIVERT) 25 mg tablet Take 25 mg by mouth as needed.  carvedilol (COREG) 25 mg tablet Take 25 mg by mouth two (2) times daily (with meals).  polyethylene glycol (MIRALAX) 17 gram packet Take 17 g by mouth daily.  azelaic acid (AZELEX) 20 % topical cream Apply  to affected area two (2) times a day.  hydroCHLOROthiazide (HYDRODIURIL) 12.5 mg tablet Take 12.5 mg by mouth daily.  latanoprost (XALATAN) 0.005 % ophthalmic solution Administer 1 Drop to both eyes nightly.        No current facility-administered medications on file prior to encounter. Date Last Reviewed: 12/4/20 currently taking 20mg of Omeprazole   Current Dietary Status:  Regular diet with thin liquids       History of reflux:  YES   Reflux medication:Omeprazole    Social History/Home Situation:lives with sister     Work/Activity History: retired    OBJECTIVE:  Objective Measure: Tool Used: National Outcomes Measurement System: Functional Communication Measures: SWALLOWING  Score:  Initial: 5 Most Recent: X (Date: -- )   Interpretation of Tool: This measure describes the change in functional communication status subsequent to speech-language pathology treatment of patients with dysphagia.  o Level 1:  Individual is not able to swallow anything safely by mouth. All nutrition and hydration is received through non-oral means (e.g., nasogastric tube, PEG). o Level 2: Individual is not able to swallow safely by mouth for nutrition and hydration, but may take some consistency with consistent maximal cues in therapy only. Alternative method of feeding required. o Level 3:  Alternative method of feeding required as individual takes less than 50% of nutrition and hydration by mouth, and/or swallowing is safe with consistent use of moderate cues to use compensatory strategies and/or requires maximum diet restriction. o Level 4:  Swallowing is safe, but usually requires moderate cues to use compensatory strategies, and/or the individual has moderate diet restrictions and/or still requires tube feeding and/or oral supplements. o Level 5:  Swallowing is safe with minimal diet restriction and/or occasionally requires minimal cueing to use compensatory strategies. The individual may occasionally self-cue. All nutrition and hydration needs are met by mouth at mealtime. o Level 6:  Swallowing is safe, and the individual eats and drinks independently and may rarely require minimal cueing. The individual usually self-cues when difficulty occurs.  May need to avoid specific food items (e.g., popcorn and nuts), or require additional time (due to dysphagia). o Level 7: The individuals ability to eat independently is not limited by swallow function. Swallowing would be safe and efficient for all consistencies. Compensatory strategies are effectively used when needed. Score Level 7 Level 6 Level 5 Level 4 Level 3 Level 2 Level 1   Modifier CH CI CJ CK CL CM CN     Respiratory Status: Room Air  CXR Results:N/A  MRI/CT Results:  Oral Motor Structure/Speech:  Oral-Motor Structure/Motor Speech  Labial: No impairment  Dentition: Natural  Oral Hygiene: good  Lingual: No impairment  Velum: No impairment  Mandible: No impairment    Cognitive and Communication Status:     Orientation Level: Appropriate for age     Perception: Appears intact  Perseveration: No perseveration noted  Safety/Judgement: Not assessed    BEDSIDE SWALLOW EVALUATION  Oral Assessment:  Oral Assessment  Labial: No impairment  Dentition: Natural  Oral Hygiene: good  Lingual: No impairment  Velum: No impairment  Mandible: No impairment  Gag Reflex: Present  P.O. Trials:  Patient Position: upright in chair    The patient was given teaspoon to tablespoon   amounts of the following:   Consistency Presented: Solid;Mixed consistency; Thin liquid;Puree  How Presented: Self-fed/presented;Cup/sip    ORAL PHASE:  Bolus Acceptance: No impairment  Bolus Formation/Control: No impairment  Propulsion: No impairment     Oral Residue: Less than 10% of bolus; Lingual    PHARYNGEAL PHASE:  Initiation of Swallow: No impairment  Laryngeal Elevation: Functional  Aspiration Signs/Symptoms: None  Vocal Quality: No impairment           Pharyngeal Phase Characteristics: Double swallow    OTHER OBSERVATIONS:  Rate/bite size: WNL   Endurance: WNL   Coments:      TREATMENT:    (In addition to Assessment/Re-Assessment sessions the following treatments were rendered)  Assessment only;  No treatment(s) provided today            LARYNGEAL / PHARYNGEAL EXERCISES:                                                                                                                                     __________________________________________________________________________________________________  Treatment Assessment:  Evaluation completed. Progression/Medical Necessity:   · Patient is expected to demonstrate progress in swallow strength, swallow timeliness, swallow function, diet tolerance and swallow safety to improve swallow safety and decrease aspiration risk. Compliance with Program/Exercises: Will assess as treatment progresses. Reason for Continuation of Services/Other Comments:  · Patient continues to require skilled intervention due to possible Dysphagia . Recommendations/Intent for next treatment session: \"Treatment next visit will focus on goals\". Total Treatment Duration:  Time In: 1330  Time Out: [address], Earla December. Gautam Manzo

## 2020-12-06 NOTE — PROGRESS NOTES
Outpatient Rehab Services  Referral Form/Physician Order  Central Scheduling Fax: [phone]  Speak to a :  Call [phone]   Please review and co-sign (electronically) OR sign and return to the below indicated clinic's fax number if you agree with this request.  Thank you! NAME:  Zahida Salcedo SSN:  [id number] :  1940   ADDRESS:  [facility] [address]  [facility] [address] Daytime Phone:  [phone] (SAUB)[phone] (mobile)    Diagnosis & Date of Onset:  speech Special Precautions:   Frequency:  [] to be determined by therapist after evaluation   OR   ____ X per week X ____ weeks    Services    [] Evaluate & Treat  [x] Special Orders________Modified barium swallow study  ________________   [] Physical Therapy  [] Occupational Therapy   [] Speech Therapy  [x] MBS w/ Speech Therapy    Specialized Programs    [] Aquatic Therapy [] Lymphedema [] Oncology Rehab   [] Balance Rehab [] Parkinson's Program [] Osteoporosis   [] Fibromyalgia [] Motion Analysis [] Spine Rehab   [] Industrial Rehab [] Hand & Upper Extremity [] Sports Injury Rehab    [] Urinary Incontinence     Locations    @ [facility] [address] [place] [address] [address] [facility]  Ph: [phone]  Fax: [phone] @ [address] [place] [address],[address]  [facility] [address]  Ph: [phone]  Fax: [phone] @ [address]  Ph: [phone]  Fax: [phone]        @ [address] [place] [address] [address]iana [address]  Ph: [phone]  Fax: [phone] @ [address] [place] [address] [facility], [address]   Ph: [phone]  Fax: [phone] @ Sandra Santos   [address]  [place], [address]  Ph: [phone]  Fax: [phone]        @ [address]  Ph: [phone]  Fax: [phone] @ [address]  Ph: [phone]  Fax: [phone] @ [address]  [address]   Ph: [phone]         @ [address] [place] [address]  Ph: [phone]  Fax: [phone]      This section is not needed if signing electronically   I certify that I have examined the above patient and outpatient rehab services are medically necessary.    ___________________________________________           Signature of Physician/Provider    ___________________________________________            Practice Name   ______________________   Date    ______________________   Referral Contact

## 2020-12-11 ENCOUNTER — HOSPITAL ENCOUNTER (OUTPATIENT)
Dept: PHYSICAL THERAPY | Age: 80
End: 2020-12-11
Payer: MEDICARE

## 2020-12-18 ENCOUNTER — APPOINTMENT (OUTPATIENT)
Dept: PHYSICAL THERAPY | Age: 80
End: 2020-12-18
Payer: MEDICARE

## 2020-12-21 NOTE — THERAPY EVALUATION
Jess Quiles  : 1940  [place]  Secondary: [address] [facility] at [facility], [address] [facility], [place], [facility]  [phone]   [phone]        OUTPATIENT SPEECH LANGUAGE PATHOLOGY: MODIFIED BARIUM SWALLOW    ICD-10: Treatment Diagnosis: dysphagia, oropharyngeal R 13.12  DATE: 2020  REFERRING PHYSICIAN: Kuldip Mandel MD MD Orders: modified barium swallow study   PAST MEDICAL HISTORY:   Ms. Alen Correa is a 79 y.o. female who  has a past medical history of Anxiety, Arthritis, Bradycardia, Cancer ([address] Chronic obstructive pulmonary disease ([address] Chronic pain, GERD (gastroesophageal reflux disease), Hematuria, Hiatal hernia, Hypertension, Nausea & vomiting, Psychiatric disorder, Sleep apnea, and Thyroid disease. She also  has a past surgical history that includes hx pacemaker (); hx hysterectomy (); pr breast surgery procedure unlisted (); hx heent (); hx cataract removal (Right, ); hx cataract removal (Left, ); and hx tonsillectomy (). MEDICAL/REFERRING DIAGNOSIS: Dysphagia [R13.10]  DATE OF ONSET: 3-4 years ago   PRIOR LEVEL OF FUNCTION: independent  PRECAUTIONS/ALLERGIES: Chicken, cigarette smoke, shahzad, Vyzulta, Rhopressa   ASSESSMENT/PLAN OF CARE:  Pt reported having spasms that almost cut off her breath when she's swallowing. She said it mainly occurs with dry foods and this results in coughing. She reported this has been ongoing for 3-4 years. Noted pt had a barium swallow in November of this year with the following results: 1. No evidence of a mass lesion or stricture in the esophagus.    2. Cervical spondylosis.    3. Pooling of the barium suspension within the valleculae may predispose to aspiration.    4. Deviation of the barium column at the level of the hypopharynx to the right. This finding is likely due to unilateral weakness in the constrictor muscles.  Has this patient had a stroke in the past?    5. The barium tablet does easily traversing the esophagus. Pt also had a bedside swallowing evaluation completed 12/4/2020 at [facility]. No overt signs/sx aspiration were observed. However, multiple swallows were observed with some consistencies. Based on the objective data described below, the patient presents with min oropharyngeal dysphagia. Swallowing was triggered at the valleculae with some cup trials of thin liquids, single straw sips of thin liquids nectar thick liquids via cup and straw, pudding, mixed consistencies and solids. Swallowing was timely with all of the consistencies except mixed where delays of 3 seconds were observed. Swallowing was triggered at the pyriform sinuses with thin liquids via spoon, some of the cup trials and consecutive sips of thin via straw. When swallowing was triggered at the pyriform sinuses, penetration occurred due to timing issues. Penetration was transient with thin via spoon and cup;  single trials of thin via straw. Penetration with consecutive sips of thin liquids via straw was not transient with no cough response noted. A cued throat clear was ineffective in completely clearing the laryngeal vestibule. No pharyngeal residue observed. Recommend continuing with current diet but use single sips if using straws. Pt has a ST appointment at [facility] this date. Recommend going to that appointment for exercises for a home exercise program.  Results/recommendations discussed with the pt. In response to education, the pt stated that didn't cure her problem. She also stated she felt the foods given to her this date were \"inadequate\" and didn't represent foods she has difficulties with. Educated pt re: why the specific foods are presented on the MBS. 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   Pt reported she was dx with essential tremors approximately 5 years ago. She reported she is followed by neurology for the tremors. May benefit from an ENT evaluation for further assessment of her vocal cords. Patient will benefit from skilled intervention to address the above impairments. RECOMMENDATIONS AND PLANNED INTERVENTIONS (Benefits and precautions of therapy have been discussed with the patient.):  · PO:  Regular  · Liquids:  regular thin   · Single sips if using straws  MEDICATIONS:  · With liquid  COMPENSATORY STRATEGIES/MODIFICATIONS INCLUDING:  · Single sips if using straws  OTHER RECOMMENDATIONS (including follow up treatment recommendations):   · Laryngeal exercises    Thank you for this referral,  Doreen Caba, MSP, CCC-SLP            SUBJECTIVE:  Pt cooperative. Present Symptoms: sensation of spasms with dry foods      Current Dietary Status:  Regular    Radiologist: Dr. Mouna Vaz  History of reflux:  []YES     []NO     Reflux medication: Omeprazole  Social History/Home Situation: independent      Work/Activity History: retired     OBJECTIVE:  Objective Measure: Tool Used: National Outcomes Measurement System: Functional Communication Measures: SWALLOWING  Score:  Initial: 6 Most Recent: X (Date: -- )   Interpretation of Tool: This measure describes the change in functional communication status subsequent to speech-language pathology treatment of patients with dysphagia.  o Level 1:  Individual is not able to swallow anything safely by mouth. All nutrition and hydration is received through non-oral means (e.g., nasogastric tube, PEG). o Level 2: Individual is not able to swallow safely by mouth for nutrition and hydration, but may take some consistency with consistent maximal cues in therapy only. Alternative method of feeding required.   o Level 3:  Alternative method of feeding required as individual takes less than 50% of nutrition and hydration by mouth, and/or swallowing is safe with consistent use of moderate cues to use compensatory strategies and/or requires maximum diet restriction. o Level 4:  Swallowing is safe, but usually requires moderate cues to use compensatory strategies, and/or the individual has moderate diet restrictions and/or still requires tube feeding and/or oral supplements. o Level 5:  Swallowing is safe with minimal diet restriction and/or occasionally requires minimal cueing to use compensatory strategies. The individual may occasionally self-cue. All nutrition and hydration needs are met by mouth at mealtime. o Level 6:  Swallowing is safe, and the individual eats and drinks independently and may rarely require minimal cueing. The individual usually self-cues when difficulty occurs. May need to avoid specific food items (e.g., popcorn and nuts), or require additional time (due to dysphagia). o Level 7: The individuals ability to eat independently is not limited by swallow function. Swallowing would be safe and efficient for all consistencies. Compensatory strategies are effectively used when needed.   Score Level 7 Level 6 Level 5 Level 4 Level 3 Level 2 Level 1   Modifier CH CI CJ CK CL CM CN       Cognitive/Communication Status:  Mental Status  Neurologic State: Alert    Oral Assessment:  Oral Assessment  Labial: No impairment  Dentition: Intact, Natural  Oral Hygiene: adequate  Lingual: No impairment    Vocal Quality: intermittent phonation breaks    Patient Viewed: Patient Position: upright in chair  Film Views: Lateral, Fluoro    Oral Prepatory:  The patient was given the following: Consistency Presented: Mixed consistency, Nectar thick liquid, Pudding, Solid, Thin liquid  How Presented: Self-fed/presented, SLP-fed/presented, Cup/sip, Spoon, Straw, Successive swallows    Oral Phase:  Bolus Acceptance: No impairment  Bolus Formation/Control: No impairment  Propulsion: No impairment     Oral Residue: None  Initiation of Swallow: Triggered at vallecula, Triggered at pyriform sinus(es)  Oral Phase Severity: Minimal    Pharyngeal Phase:  Timing: Pooling 1-5 sec  Decreased Tongue Base Retraction?: No  Laryngeal Elevation: Incomplete laryngeal closure  Penetration: Flash/transient, During swallow  Aspiration/Timing: No evidence of aspiration  Aspiration/Penetration Score: 3 (Penetration/Visible residue-Contrast remains above the folds/cords, but is not cleared)  Pharyngeal Symmetry: Not assessed  Pharyngeal Dysfunction: None  Pharyngeal Phase Severity: Minimal  Pharyngeal-Esophageal Segment: No impairment    Assessment only;  No treatment(s) provided today  __________________________________________________________________________________________________  Recommendations for treatment: laryngeal exercises  Total Treatment Duration:  Time In: 7986   Time Out: MITZY Mast, CCC-SLP

## 2020-12-22 ENCOUNTER — HOSPITAL ENCOUNTER (OUTPATIENT)
Dept: GENERAL RADIOLOGY | Age: 80
Discharge: HOME | End: 2020-12-22
Payer: MEDICARE

## 2020-12-22 ENCOUNTER — HOSPITAL ENCOUNTER (OUTPATIENT)
Dept: PHYSICAL THERAPY | Age: 80
Discharge: HOME | End: 2020-12-22
Payer: MEDICARE

## 2020-12-22 DIAGNOSIS — R13.10 DYSPHAGIA: ICD-10-CM

## 2020-12-22 DIAGNOSIS — R13.12 OROPHARYNGEAL DYSPHAGIA: ICD-10-CM

## 2020-12-22 PROCEDURE — 74230 X-RAY XM SWLNG FUNCJ C+: CPT

## 2020-12-22 PROCEDURE — 92507 TX SP LANG VOICE COMM INDIV: CPT

## 2020-12-22 PROCEDURE — 92611 MOTION FLUOROSCOPY/SWALLOW: CPT

## 2020-12-22 PROCEDURE — 74011000255 HC RX REV CODE- 255: Performed by: INTERNAL MEDICINE

## 2020-12-22 RX ADMIN — BARIUM SULFATE 60 ML: 980 POWDER, FOR SUSPENSION ORAL at 09:45

## 2020-12-22 RX ADMIN — BARIUM SULFATE 15 ML: 400 PASTE ORAL at 09:45

## 2020-12-22 RX ADMIN — BARIUM SULFATE 30 ML: 400 SUSPENSION ORAL at 09:45

## 2020-12-22 NOTE — PROGRESS NOTES
Josette Paige  : 1940  [place]  Secondary: [address] [facility]-30 at [place] [id number] [facility], [address] [place] [address],[address], [address] [facility]  [phone]   [phone]       OUTPATIENT SPEECH LANGUAGE PATHOLOGY: Daily Note and Discharge Summary  ICD-10: Treatment Diagnosis: Oropharyngeal Dysphagia R13.12  REFERRING PHYSICIAN: Rubin Bustamante MD MD Orders: Evaluate and treat  PAST MEDICAL HISTORY:   Ms. Gloria Flores is a 79 y.o. female who  has a past medical history of Anxiety, Arthritis, Bradycardia, Cancer ([address] Chronic obstructive pulmonary disease ([address] Chronic pain, GERD (gastroesophageal reflux disease), Hematuria, Hiatal hernia, Hypertension, Nausea & vomiting, Psychiatric disorder, Sleep apnea, and Thyroid disease. She also  has a past surgical history that includes hx pacemaker (); hx hysterectomy (); pr breast surgery procedure unlisted (); hx heent (); hx cataract removal (Right, ); hx cataract removal (Left, ); and hx tonsillectomy (). MEDICAL/REFERRING DIAGNOSIS: speech  DATE OF ONSET: several years ago   PRIOR LEVEL OF FUNCTION: independent with ADLs  PRECAUTIONS/ALLERGIES: Chicken derived     ASSESSMENT:  Patient is a 79year old female who was referred for Dysphagia evaluation due to difficulty swallowing. She reports that she's been having difficulty swallowing for several years. Patient recently had a barium test which suggested some esophageal issues. She had recent EGD done due to esophageal spasms. She did not notice any changes in her swallowing after this procedure. Her trigger foods are bread, corn bread and drink after each bite of food. She's had about 20 pound weight loss in the last 4 months. She does have GERD and currently Omeprazole but she has not seen much difference taking this. She does sleep apnea in which she wears a CPAP.  She does have essential tremor and being followed by physician at [facility]. Denies CVA or TIA. Patient recently had a barium swallow which showed some issues with her esophageal muscles in addition she had moderate valleculae residue has well. No aspiration was observed on study. Based on the objective data described below, the patient presents with minimal clinical s/sx of Dysphagia. Oral motor exam was WNL's for patient's age. She was given trials of thin liquids via cup, puree, mixed and solids. No overt clinical s/sx of aspiration was observed with any trials. Double swallow observed with puree, mixed and solids. ST recommends patient participate in MBSS to further assess swallow function for proper recommendations and or strategies. ST will ask referring MD for order. ST recommends continuing with current diet until completion of MBSS  Patient will benefit from skilled intervention to address the below impairments. Patient had MBS performed with morning with results as follows:  Based on the objective data described below, the patient presents with min oropharyngeal dysphagia. Swallowing was triggered at the valleculae with some cup trials of thin liquids, single straw sips of thin liquids nectar thick liquids via cup and straw, pudding, mixed consistencies and solids. Swallowing was timely with all of the consistencies except mixed where delays of 3 seconds were observed. Swallowing was triggered at the pyriform sinuses with thin liquids via spoon, some of the cup trials and consecutive sips of thin via straw. When swallowing was triggered at the pyriform sinuses, penetration occurred due to timing issues. Penetration was transient with thin via spoon and cup;  single trials of thin via straw. Penetration with consecutive sips of thin liquids via straw was not transient with no cough response noted. A cued throat clear was ineffective in completely clearing the laryngeal vestibule. No pharyngeal residue observed.   Recommend continuing with current diet but use single sips if using straws. Pt has a ST appointment at [address] [place] [address] this date. Patient seen currently for training in home exercise program and compensatory safe swallowing strategies. ?????? ? ? This section established at most recent assessment??????????  PROBLEM LIST (Impairments causing functional limitations):  1. Dysphagia  GOALS: (Goals have been discussed and agreed upon with patient.)  SHORT-TERM FUNCTIONAL GOALS: Time Frame: 6 weeks  Patient will participate in MBSS with 100% participation. Goal met 12/22/20  Patient will complete laryngeal strengthening exercises to reduce/eliminate clinical s/sx of aspiration. Goal met 12/22/20  DISCHARGE GOALS: Time Frame: 1 month  1. Patient will tolerate least restrictive diet without overt clinical s/sx aspiration or airway compromise. Goal met 12/22/20  REHABILITATION POTENTIAL FOR STATED GOALS: FairPLAN OF CARE:  Patient will benefit from skilled intervention to address the following impairments. RECOMMENDATIONS AND PLANNED INTERVENTIONS (Benefits and precautions of therapy have been discussed with the patient.):  · continue prescribed diet  MEDICATIONS:  · With liquid  COMPENSATORY STRATEGIES/MODIFICATIONS INCLUDING:  · Alternate liquids/solids  · Small sips and bites  · Slow rate of intake  · Upright for all PO  · Remain upright for 20-39 min following PO  OTHER RECOMMENDATIONS (including follow up treatment recommendations):   · Laryngeal exercises  RECOMMENDED DIET MODIFICATIONS DISCUSSED WITH:  · Patient  TREATMENT PLAN EFFECTIVE DATES: 12/4/2020 TO 1/5/2021 (30 days). .  FREQUENCY/DURATION: No further skilled swallow therapy currently indicated. Regarding Mendel Miss Simmons's therapy, I certify that the treatment plan above will be carried out by a therapist or under their direction.   Thank you for this referral,  Bishnu Zaman MA, CCC-SLP                 Referring Physician Signature: Harpreet Mccann MD    Date SUBJECTIVE:  Patient pleasant and cooperative. Present Symptoms:       Current Medications:   Current Medications:          Current Outpatient Medications on File Prior to Encounter   Medication Sig Dispense Refill    verapamil ER (CALAN-SR) 240 mg CR tablet Take  by mouth nightly.        lisinopril (PRINIVIL, ZESTRIL) 20 mg tablet Take  by mouth daily.        levothyroxine (SYNTHROID) 50 mcg tablet Take  by mouth Daily (before breakfast).        omeprazole (PRILOSEC) 40 mg capsule Take 40 mg by mouth daily.        metroNIDAZOLE (METROGEL) 1 % topical gel Apply  to affected area daily. Use a thin layer to affected areas after washing        cloNIDine HCl (CATAPRES) 0.1 mg tablet Take  by mouth as needed. Take if blood pressure over 200.        aspirin 81 mg chewable tablet Take 81 mg by mouth daily.        atorvastatin (LIPITOR) 10 mg tablet Take  by mouth daily. Indications: pm        meclizine (ANTIVERT) 25 mg tablet Take 25 mg by mouth as needed.        carvedilol (COREG) 25 mg tablet Take 25 mg by mouth two (2) times daily (with meals).        polyethylene glycol (MIRALAX) 17 gram packet Take 17 g by mouth daily.        azelaic acid (AZELEX) 20 % topical cream Apply  to affected area two (2) times a day.        hydroCHLOROthiazide (HYDRODIURIL) 12.5 mg tablet Take 12.5 mg by mouth daily.        latanoprost (XALATAN) 0.005 % ophthalmic solution Administer 1 Drop to both eyes nightly.          No current facility-administered medications on file prior to encounter. Date Last Reviewed: 12/22/2020   Current Dietary Status:  Regular diet and thin liquids      History of reflux:  YES    Reflux medication:omeprazole  Social History/Home Situation: lives with sister      Work/Activity History: retired    OBJECTIVE:  Objective Measure:   Tool Used: National Outcomes Measurement System: Functional Communication Measures: SWALLOWING  Score:  Initial: 5 Most Recent: 6 (Date: 12/22/2020 ) Interpretation of Tool: This measure describes the change in functional communication status subsequent to speech-language pathology treatment of patients with dysphagia.  o Level 1:  Individual is not able to swallow anything safely by mouth. All nutrition and hydration is received through non-oral means (e.g., nasogastric tube, PEG). o Level 2: Individual is not able to swallow safely by mouth for nutrition and hydration, but may take some consistency with consistent maximal cues in therapy only. Alternative method of feeding required. o Level 3:  Alternative method of feeding required as individual takes less than 50% of nutrition and hydration by mouth, and/or swallowing is safe with consistent use of moderate cues to use compensatory strategies and/or requires maximum diet restriction. o Level 4:  Swallowing is safe, but usually requires moderate cues to use compensatory strategies, and/or the individual has moderate diet restrictions and/or still requires tube feeding and/or oral supplements. o Level 5:  Swallowing is safe with minimal diet restriction and/or occasionally requires minimal cueing to use compensatory strategies. The individual may occasionally self-cue. All nutrition and hydration needs are met by mouth at mealtime. o Level 6:  Swallowing is safe, and the individual eats and drinks independently and may rarely require minimal cueing. The individual usually self-cues when difficulty occurs. May need to avoid specific food items (e.g., popcorn and nuts), or require additional time (due to dysphagia). o Level 7: The individuals ability to eat independently is not limited by swallow function. Swallowing would be safe and efficient for all consistencies. Compensatory strategies are effectively used when needed.   Score Level 7 Level 6 Level 5 Level 4 Level 3 Level 2 Level 1   Modifier CH CI CJ CK CL CM CN       Respiratory Status:      Room Air  CXR Results:N/A  MRI/CT Results:N/A  Oral Motor Structure/Speech:  Oral-Motor Structure/Motor Speech  Labial: No impairment  Dentition: Natural  Oral Hygiene: good  Lingual: No impairment  Velum: No impairment  Mandible: No impairment     Cognitive and Communication Status:  Orientation Level: Appropriate for age  Perception: Appears intact  Perseveration: No perseveration noted  Safety/Judgement: Not assessed     BEDSIDE SWALLOW EVALUATION  Oral Assessment:  Oral Assessment  Labial: No impairment  Dentition: Natural  Oral Hygiene: good  Lingual: No impairment  Velum: No impairment  Mandible: No impairment  Gag Reflex: Present  P.O. Trials:  Patient Position: upright in chair     The patient was given teaspoon to tablespoon   amounts of the following:   Consistency Presented: Solid;Mixed consistency; Thin liquid;Puree  How Presented: Self-fed/presented;Cup/sip     ORAL PHASE:  Bolus Acceptance: No impairment  Bolus Formation/Control: No impairment  Propulsion: No impairment  Oral Residue: Less than 10% of bolus; Lingual     PHARYNGEAL PHASE:  Initiation of Swallow: No impairment  Laryngeal Elevation: Functional  Aspiration Signs/Symptoms: None  Vocal Quality: No impairment  Pharyngeal Phase Characteristics: Double swallow     OTHER OBSERVATIONS:  Rate/bite size: WNL          Endurance: WNL             TREATMENT:    (In addition to Assessment/Re-Assessment sessions the following treatments were rendered)  Dysphagia Activities: Activities/Procedures listed utilized to improve progress in swallow timeliness and swallow function. Required no cueing to improve swallow safety.   Patient instructed in laryngeal exercises and provided written information regarding same for home exercise program.       LARYNGEAL / PHARYNGEAL EXERCISES:      Mendelsohn maneuver 1 set of 5 reps   Effortful pitch glide 1 set of 5 reps   Extended phonation 1 set of 5 reps Patient also given written information regarding laryngeal relaxation techniques. __________________________________________________________________________________________________  Treatment Assessment:  No further skilled swallow intervention currently indicated, defer to GI for esophageal management. Progression/Medical Necessity:   · All swallowing goals met at this time.     Total Treatment Duration:    Time in: 1150    Time out: [address] [place], [place], [facility]

## 2022-05-15 ENCOUNTER — HOSPITAL ENCOUNTER (EMERGENCY)
Age: 82
Discharge: HOME | End: 2022-05-15
Attending: EMERGENCY MEDICINE
Payer: MEDICARE

## 2022-05-15 ENCOUNTER — APPOINTMENT (OUTPATIENT)
Dept: GENERAL RADIOLOGY | Age: 82
End: 2022-05-15
Attending: EMERGENCY MEDICINE
Payer: MEDICARE

## 2022-05-15 VITALS
BODY MASS INDEX: 27.6 KG/M2 | OXYGEN SATURATION: 97 % | WEIGHT: 150 LBS | HEIGHT: 62 IN | RESPIRATION RATE: 16 BRPM | HEART RATE: 63 BPM | SYSTOLIC BLOOD PRESSURE: 169 MMHG | DIASTOLIC BLOOD PRESSURE: 60 MMHG | TEMPERATURE: 98.4 F

## 2022-05-15 DIAGNOSIS — M17.12 OSTEOARTHRITIS OF LEFT KNEE, UNSPECIFIED OSTEOARTHRITIS TYPE: Primary | ICD-10-CM

## 2022-05-15 DIAGNOSIS — R22.42 LOCALIZED SWELLING OF LEFT LOWER EXTREMITY: ICD-10-CM

## 2022-05-15 LAB
ALBUMIN SERPL-MCNC: 4.4 G/DL (ref 3.2–4.6)
ALBUMIN/GLOB SERPL: 1.9 {RATIO}
ALP SERPL-CCNC: 80 U/L (ref 45–117)
ALT SERPL-CCNC: 26 U/L (ref 13–61)
ANION GAP SERPL CALC-SCNC: 11 MMOL/L (ref 7–16)
AST SERPL-CCNC: 23 U/L (ref 15–37)
BILIRUB SERPL-MCNC: 0.6 MG/DL (ref 0.2–1.1)
BUN SERPL-MCNC: 17 MG/DL (ref 7–18)
CALCIUM SERPL-MCNC: 9.4 MG/DL (ref 8.3–10.4)
CHLORIDE SERPL-SCNC: 106 MMOL/L (ref 98–107)
CO2 SERPL-SCNC: 28 MMOL/L (ref 21–32)
CREAT SERPL-MCNC: 0.68 MG/DL (ref 0.6–1)
ERYTHROCYTE [DISTWIDTH] IN BLOOD BY AUTOMATED COUNT: 12.3 % (ref 11.9–14.6)
GLOBULIN SER CALC-MCNC: 2.3 G/DL (ref 2.3–3.5)
GLUCOSE SERPL-MCNC: 124 MG/DL (ref 65–100)
HCT VFR BLD AUTO: 38.2 % (ref 35.8–46.3)
HGB BLD-MCNC: 12.7 G/DL (ref 11.7–15.4)
MCH RBC QN AUTO: 32.1 PG (ref 26.1–32.9)
MCHC RBC AUTO-ENTMCNC: 33.2 G/DL (ref 31.4–35)
MCV RBC AUTO: 96.5 FL (ref 79.6–97.8)
NRBC # BLD: 0 K/UL (ref 0–0.2)
PLATELET # BLD AUTO: 181 K/UL (ref 150–450)
PMV BLD AUTO: 9.4 FL (ref 9.4–12.3)
POTASSIUM SERPL-SCNC: 4.1 MMOL/L (ref 3.5–5.1)
PROT SERPL-MCNC: 6.7 G/DL (ref 6.4–8.2)
RBC # BLD AUTO: 3.96 M/UL (ref 4.05–5.2)
SODIUM SERPL-SCNC: 145 MMOL/L (ref 136–145)
WBC # BLD AUTO: 6.1 K/UL (ref 4.3–11.1)

## 2022-05-15 PROCEDURE — 74011250636 HC RX REV CODE- 250/636: Performed by: EMERGENCY MEDICINE

## 2022-05-15 PROCEDURE — 99284 EMERGENCY DEPT VISIT MOD MDM: CPT

## 2022-05-15 PROCEDURE — 96372 THER/PROPH/DIAG INJ SC/IM: CPT

## 2022-05-15 PROCEDURE — 73562 X-RAY EXAM OF KNEE 3: CPT

## 2022-05-15 PROCEDURE — 80053 COMPREHEN METABOLIC PANEL: CPT

## 2022-05-15 PROCEDURE — 85027 COMPLETE CBC AUTOMATED: CPT

## 2022-05-15 RX ORDER — ENOXAPARIN SODIUM 100 MG/ML
100 INJECTION SUBCUTANEOUS ONCE
Status: COMPLETED | OUTPATIENT
Start: 2022-05-15 | End: 2022-05-15

## 2022-05-15 RX ADMIN — ENOXAPARIN SODIUM 100 MG: 100 INJECTION SUBCUTANEOUS at 20:24

## 2022-05-15 NOTE — ED PROVIDER NOTES
Patient with a history of arthritis presents ER complaining of left knee pain. States she has had some worsening left knee pain for the past 3 days. Denies any trauma. Swelling around her knee, lower leg as well as behind her knee. Reports she had issues with her right knee recently related to arthritis as well. The history is provided by the patient. Knee Pain   This is a recurrent problem. The current episode started more than 2 days ago. The problem occurs constantly. The problem has not changed since onset. The pain is present in the left knee. The quality of the pain is described as aching. The pain is at a severity of 3/10. The pain is moderate. Associated symptoms include stiffness. Pertinent negatives include no numbness and no back pain. She has tried nothing for the symptoms. There has been no history of extremity trauma.         Past Medical History:   Diagnosis Date    Anxiety     Arthritis     Bradycardia     Cancer (HCC)     breast    Chronic obstructive pulmonary disease (HCC)     Chronic pain     back    GERD (gastroesophageal reflux disease)     controlled with medication    Hematuria     Hiatal hernia     Hypertension     Nausea & vomiting     Psychiatric disorder     Sleep apnea     uses cpap    Thyroid disease     patial thyroidectomy       Past Surgical History:   Procedure Laterality Date    HX BILATERAL MASTECTOMY Bilateral 2002    bilateral mastectomy    HX CATARACT REMOVAL Right 1991    HX CATARACT REMOVAL Left 1994    HX HEENT  1985    partial thyroidectomy    HX HYSTERECTOMY  1974    HX PACEMAKER  2014    HX TONSILLECTOMY  1950         Family History:   Problem Relation Age of Onset    Diabetes Mother     Heart Disease Mother     Stroke Mother     Heart Attack Father     Hypertension Sister     Heart Attack Brother     Heart Surgery Brother     Cancer Sister         breast    Hypertension Sister        Social History     Socioeconomic History    Marital status: SINGLE     Spouse name: Not on file    Number of children: Not on file    Years of education: Not on file    Highest education level: Not on file   Occupational History    Not on file   Tobacco Use    Smoking status: Never Smoker    Smokeless tobacco: Never Used   Substance and Sexual Activity    Alcohol use: No    Drug use: No    Sexual activity: Never   Other Topics Concern    Not on file   Social History Narrative    Not on file     Social Determinants of Health     Financial Resource Strain:     Difficulty of Paying Living Expenses: Not on file   Food Insecurity:     Worried About Running Out of Food in the Last Year: Not on file    [place] of Food in the Last Year: Not on file   Transportation Needs:     Lack of Transportation (Medical): Not on file    Lack of Transportation (Non-Medical): Not on file   Physical Activity:     Days of Exercise per Week: Not on file    Minutes of Exercise per Session: Not on file   Stress:     Feeling of Stress : Not on file   Social Connections:     Frequency of Communication with Friends and Family: Not on file    Frequency of Social Gatherings with Friends and Family: Not on file    Attends Gnosticist Services: Not on file    Active Member of [address] [place] [address] curated.by or Organizations: Not on file    Attends Club or Organization Meetings: Not on file    Marital Status: Not on file   Intimate Partner Violence:     Fear of Current or Ex-Partner: Not on file    Emotionally Abused: Not on file    Physically Abused: Not on file    Sexually Abused: Not on file   Housing Stability:     Unable to Pay for Housing in the Last Year: Not on file    Number of [place] in the Last Year: Not on file    Unstable Housing in the Last Year: Not on file         ALLERGIES: Chicken derived    Review of Systems   Constitutional: Negative for fatigue and unexpected weight change. HENT: Negative for congestion and voice change.     Eyes: Negative for photophobia and redness. Respiratory: Negative for choking, chest tightness, shortness of breath and stridor. Cardiovascular: Positive for leg swelling. Negative for chest pain. Gastrointestinal: Negative for abdominal pain, nausea and vomiting. Endocrine: Negative for polyphagia and polyuria. Genitourinary: Negative for decreased urine volume and urgency. Musculoskeletal: Positive for stiffness. Negative for back pain and gait problem. Skin: Negative for color change and pallor. Neurological: Negative for tremors, syncope, light-headedness and numbness. Hematological: Negative for adenopathy. Does not bruise/bleed easily. Psychiatric/Behavioral: Negative for behavioral problems and confusion. All other systems reviewed and are negative. Vitals:    05/15/22 1821   BP: (!) 169/60   Pulse: 63   Resp: 16   Temp: 98.4 °F (36.9 °C)   SpO2: 97%   Weight: 68 kg (150 lb)   Height: 5' 2\" (1.575 m)            Physical Exam  Vitals and nursing note reviewed. Constitutional:       General: She is not in acute distress. Appearance: Normal appearance. She is not ill-appearing. HENT:      Head: Normocephalic and atraumatic. Right Ear: External ear normal.      Left Ear: External ear normal.      Nose: Nose normal. No congestion or rhinorrhea. Eyes:      Extraocular Movements: Extraocular movements intact. Pupils: Pupils are equal, round, and reactive to light. Cardiovascular:      Rate and Rhythm: Normal rate and regular rhythm. Pulses: Normal pulses. Heart sounds: Normal heart sounds. Pulmonary:      Effort: Pulmonary effort is normal.      Breath sounds: Normal breath sounds. No wheezing or rhonchi. Abdominal:      General: Abdomen is flat. There is no distension. Palpations: Abdomen is soft. There is no mass. Hernia: No hernia is present. Musculoskeletal:      Cervical back: Normal range of motion and neck supple. Left knee: Swelling present. No erythema.  Normal range of motion. Tenderness present. Skin:     General: Skin is warm. Coloration: Skin is not jaundiced or pale. Findings: No bruising. Neurological:      General: No focal deficit present. Mental Status: She is alert and oriented to person, place, and time. Cranial Nerves: No cranial nerve deficit. Sensory: No sensory deficit. Motor: No weakness. Coordination: Coordination normal.   Psychiatric:         Mood and Affect: Mood normal.          MDM  Number of Diagnoses or Management Options  Diagnosis management comments: Swelling around left knee as well as left lower leg. Plan for x-ray. Screening labs. 8:27 PM  Labs fairly stable here. Discussed with patient results of x-ray as well. Plan to discharge home. Will give a dose of Lovenox here, I will have patient return tomorrow for outpatient ultrasound. She is agreeable to plan.        Amount and/or Complexity of Data Reviewed  Clinical lab tests: reviewed and ordered  Tests in the radiology section of CPT®: ordered and reviewed    Risk of Complications, Morbidity, and/or Mortality  Presenting problems: moderate  Diagnostic procedures: moderate  Management options: moderate    Patient Progress  Patient progress: stable         Procedures               Results Include:    Recent Results (from the past 24 hour(s))   CBC W/O DIFF    Collection Time: 05/15/22  6:33 PM   Result Value Ref Range    WBC 6.1 4.3 - 11.1 K/uL    RBC 3.96 (L) 4.05 - 5.20 M/uL    HGB 12.7 11.7 - 15.4 g/dL    HCT 38.2 35.8 - 46.3 %    MCV 96.5 79.6 - 97.8 FL    MCH 32.1 26.1 - 32.9 PG    MCHC 33.2 31.4 - 35.0 g/dL    RDW 12.3 11.9 - 14.6 %    PLATELET 585 942 - 025 K/uL    MPV 9.4 9.4 - 12.3 FL    ABSOLUTE NRBC 0.00 0.0 - 0.2 K/uL   METABOLIC PANEL, COMPREHENSIVE    Collection Time: 05/15/22  6:33 PM   Result Value Ref Range    Sodium 145 136 - 145 mmol/L    Potassium 4.1 3.5 - 5.1 mmol/L    Chloride 106 98 - 107 mmol/L    CO2 28 21 - 32 mmol/L Anion gap 11 7.0 - 16.0 mmol/L    Glucose 124 (H) 65 - 100 mg/dL    BUN 17 7.0 - 18.0 MG/DL    Creatinine 0.68 0.6 - 1.0 MG/DL    GFR est AA >107 >60 ml/min/1.73m2    GFR est non-AA >60 >60 ml/min/1.73m2    Calcium 9.4 8.3 - 10.4 MG/DL    Bilirubin, total 0.6 0.2 - 1.1 MG/DL    ALT (SGPT) 26 13.0 - 61.0 U/L    AST (SGOT) 23 15 - 37 U/L    Alk. phosphatase 80 45.0 - 117.0 U/L    Protein, total 6.7 6.4 - 8.2 g/dL    Albumin 4.4 3.2 - 4.6 g/dL    Globulin 2.3 2.3 - 3.5 g/dL    A-G Ratio 1.9       Voice dictation software was used during the making of this note. This software is not perfect and grammatical and other typographical errors may be present. This note has been proofread, but may still contain errors. Luis Serrano MD; 5/15/2022 @8:27 PM   ===================================================================    I wore appropriate PPE throughout this patient's ED visit.  Luis Serrano MD, 8:27 PM

## 2022-05-15 NOTE — ED TRIAGE NOTES
Pt presents to ED c/o left knee pain/swelling since Friday. Denies hx of DVT. Not on blood thinners. Pt concerned for DVT. Masked.

## 2022-05-16 ENCOUNTER — HOSPITAL ENCOUNTER (EMERGENCY)
Age: 82
Discharge: HOME | End: 2022-05-16
Attending: EMERGENCY MEDICINE
Payer: MEDICARE

## 2022-05-16 ENCOUNTER — APPOINTMENT (OUTPATIENT)
Dept: ULTRASOUND IMAGING | Age: 82
End: 2022-05-16
Attending: EMERGENCY MEDICINE
Payer: MEDICARE

## 2022-05-16 VITALS
BODY MASS INDEX: 27.6 KG/M2 | HEIGHT: 62 IN | TEMPERATURE: 98 F | WEIGHT: 150 LBS | SYSTOLIC BLOOD PRESSURE: 105 MMHG | HEART RATE: 87 BPM | RESPIRATION RATE: 16 BRPM | OXYGEN SATURATION: 99 % | DIASTOLIC BLOOD PRESSURE: 90 MMHG

## 2022-05-16 DIAGNOSIS — I82.4Y2 DEEP VEIN THROMBOSIS (DVT) OF PROXIMAL VEIN OF LEFT LOWER EXTREMITY, UNSPECIFIED CHRONICITY (HCC): Primary | ICD-10-CM

## 2022-05-16 PROCEDURE — 93971 EXTREMITY STUDY: CPT

## 2022-05-16 PROCEDURE — 74011250637 HC RX REV CODE- 250/637: Performed by: EMERGENCY MEDICINE

## 2022-05-16 PROCEDURE — 99284 EMERGENCY DEPT VISIT MOD MDM: CPT

## 2022-05-16 RX ADMIN — APIXABAN 10 MG: 5 TABLET, FILM COATED ORAL at 09:25

## 2022-05-16 NOTE — ED NOTES
I have reviewed discharge instructions with the patient. The patient verbalized understanding. Patient left ED via Discharge Method: ambulatory to Home with friend. Opportunity for questions and clarification provided. Patient given 0 scripts. To continue your aftercare when you leave the hospital, you may receive an automated call from our care team to check in on how you are doing. This is a free service and part of our promise to provide the best care and service to meet your aftercare needs.  If you have questions, or wish to unsubscribe from this service please call [phone]. Thank you for Choosing our [facility] Emergency Department.

## 2022-05-16 NOTE — ED PROVIDER NOTES
Chief complaint : Leg swelling    HISTORY OF PRESENT ILLNESS :  Location : Left leg    Quality : Different from her osteoarthritis knee swelling    Quantity : Constant    Timing : Friday, May 13    Severity : Mild to moderate    Context : Seen here last night given a shot of Lovenox to await morning time ultrasound  No history of DVT  Distant history of breast cancer 20 years ago seems to be only DVT risk factor    Alleviating / exacerbating factors : No remedies attempted    Associated Symptoms : No numbness             Past Medical History:   Diagnosis Date    Anxiety     Arthritis     Bradycardia     Cancer (HCC)     breast    Chronic obstructive pulmonary disease (HCC)     Chronic pain     back    GERD (gastroesophageal reflux disease)     controlled with medication    Hematuria     Hiatal hernia     Hypertension     Nausea & vomiting     Psychiatric disorder     Sleep apnea     uses cpap    Thyroid disease     patial thyroidectomy       Past Surgical History:   Procedure Laterality Date    HX BILATERAL MASTECTOMY Bilateral 2002    bilateral mastectomy    HX CATARACT REMOVAL Right 1991    HX CATARACT REMOVAL Left 1994    HX HEENT  1985    partial thyroidectomy    HX HYSTERECTOMY  1974    HX PACEMAKER  2014    HX TONSILLECTOMY  1950         Family History:   Problem Relation Age of Onset    Diabetes Mother     Heart Disease Mother     Stroke Mother     Heart Attack Father     Hypertension Sister     Heart Attack Brother     Heart Surgery Brother     Cancer Sister         breast    Hypertension Sister        Social History     Socioeconomic History    Marital status: SINGLE     Spouse name: Not on file    Number of children: Not on file    Years of education: Not on file    Highest education level: Not on file   Occupational History    Not on file   Tobacco Use    Smoking status: Never Smoker    Smokeless tobacco: Never Used   Substance and Sexual Activity    Alcohol use:  No  Drug use: No    Sexual activity: Never   Other Topics Concern    Not on file   Social History Narrative    Not on file     Social Determinants of Health     Financial Resource Strain:     Difficulty of Paying Living Expenses: Not on file   Food Insecurity:     Worried About Running Out of Food in the Last Year: Not on file    [place] of Food in the Last Year: Not on file   Transportation Needs:     Lack of Transportation (Medical): Not on file    Lack of Transportation (Non-Medical): Not on file   Physical Activity:     Days of Exercise per Week: Not on file    Minutes of Exercise per Session: Not on file   Stress:     Feeling of Stress : Not on file   Social Connections:     Frequency of Communication with Friends and Family: Not on file    Frequency of Social Gatherings with Friends and Family: Not on file    Attends Latter day Services: Not on file    Active Member of [address] [place] [address] [employer] or Organizations: Not on file    Attends Club or Organization Meetings: Not on file    Marital Status: Not on file   Intimate Partner Violence:     Fear of Current or Ex-Partner: Not on file    Emotionally Abused: Not on file    Physically Abused: Not on file    Sexually Abused: Not on file   Housing Stability:     Unable to Pay for Housing in the Last Year: Not on file    Number of [place] in the Last Year: Not on file    Unstable Housing in the Last Year: Not on file         ALLERGIES: Chicken derived    Review of Systems   Constitutional: Positive for fatigue. Negative for chills and fever. HENT: Negative for rhinorrhea and sore throat. Eyes: Negative for discharge and redness. Respiratory: Negative for cough and shortness of breath. Cardiovascular: Positive for leg swelling. Negative for chest pain and palpitations. Gastrointestinal: Negative for abdominal pain, blood in stool, constipation, nausea and vomiting. Genitourinary: Negative for difficulty urinating and dysuria.    Musculoskeletal: Positive for myalgias. Negative for arthralgias and back pain. Skin: Negative for rash. Neurological: Negative for dizziness and headaches. All other systems reviewed and are negative. Vitals:    05/16/22 0818   BP: (!) 105/90   Pulse: 87   Resp: 16   Temp: 98 °F (36.7 °C)   SpO2: 99%   Weight: 68 kg (150 lb)   Height: 5' 2\" (1.575 m)            Physical Exam  Vitals and nursing note reviewed. Constitutional:       General: She is not in acute distress. Appearance: Normal appearance. She is well-developed. She is not ill-appearing, toxic-appearing or diaphoretic. HENT:      Head: Normocephalic and atraumatic. Right Ear: External ear normal.      Left Ear: External ear normal.   Eyes:      General:         Right eye: No discharge. Left eye: No discharge. Conjunctiva/sclera: Conjunctivae normal.   Pulmonary:      Effort: Pulmonary effort is normal. No respiratory distress. Musculoskeletal:         General: Swelling present. Normal range of motion. Cervical back: Normal range of motion and neck supple. Skin:     General: Skin is warm and dry. Findings: No rash. Neurological:      General: No focal deficit present. Mental Status: She is alert and oriented to person, place, and time. Mental status is at baseline. Motor: No abnormal muscle tone. Comments: cni 2-12 grossly  Nl gait,  Nl speech     Psychiatric:         Mood and Affect: Mood normal.         Behavior: Behavior normal.          MDM  Number of Diagnoses or Management Options  Deep vein thrombosis (DVT) of proximal vein of left lower extremity, unspecified chronicity ([address]): new and requires workup  Diagnosis management comments: Medical decision making note:  DVT left peroneal vein, start Eliquis,  PCP for follow-up  Anticoagulation risks and instructions discussed  This concludes the \"medical decision making note\" part of this emergency department visit note.          Amount and/or Complexity of Data Reviewed  Tests in the radiology section of CPT®: reviewed and ordered (DUPLEX LOWER EXT VENOUS LEFT   Final Result    Occlusive thrombus in the left peroneal vein.  The remainder of the left deep venous system is patent.   )    Risk of Complications, Morbidity, and/or Mortality  Presenting problems: moderate  Diagnostic procedures: low  Management options: low    Patient Progress  Patient progress: improved         Procedures

## 2022-05-16 NOTE — DISCHARGE INSTRUCTIONS
Try taking 2 extra strength Tylenol every 6 hours  Try taking 1 Aleve every 12 hours for a few days    Start the blood thinner, Eliquis, 2 tablets twice a day for 1 week  Then decrease the dose to 1 tablet twice a day for 6 months    Follow-up with your primary care doctor in a week    Return to the ER immediately for any significant falls in which you have hit your head

## 2022-05-16 NOTE — DISCHARGE INSTRUCTIONS
Remember to return to this facility at 8:00 in the morning for an outpatient radiology ultrasound  Return to the ER for any new, worsening or life-threatening symptoms

## 2022-05-16 NOTE — ED NOTES
I have reviewed discharge instructions with the patient. The patient verbalized understanding. Patient left ED via Discharge Method: wheelchair to Home with family    Opportunity for questions and clarification provided. Patient given 2 scripts. To continue your aftercare when you leave the hospital, you may receive an automated call from our care team to check in on how you are doing. This is a free service and part of our promise to provide the best care and service to meet your aftercare needs.  If you have questions, or wish to unsubscribe from this service please call [phone]. Thank you for Choosing our [facility] Emergency Department.

## (undated) DEVICE — PROBE BPLR 18GA 45DEG ANG BLNT TIP HEMSTAT ERAS WET-FIELD

## (undated) DEVICE — DISPOSABLE BIPOLAR CODE, 12' (3.66 M): Brand: CONMED

## (undated) DEVICE — SUTURE ABSORBABLE MONOFILAMENT 6-0 G-1 18 IN PLN GUT 770G

## (undated) DEVICE — SATINSLIT® KNIFE 3.2MM ANGLED: Brand: SATINSLIT®

## (undated) DEVICE — EYE SPEAR / FINE DISSECTOR: Brand: DEROYAL

## (undated) DEVICE — SUTURE ETHLN SZ 10-0 L12IN NONABSORBABLE BLK CS160-6 L5.5MM 9000G

## (undated) DEVICE — SOLUTION IRRIGATION BAL SALT SOLUTION 500 ML BTL 6/CA BSS +

## (undated) DEVICE — SATINCRESCENT® KNIFE ANGLED BEVEL UP: Brand: SATINCRESCENT®

## (undated) DEVICE — Z DISCONTINUED NO SUB IDED SHIELD EYE PLAS RT BGE M 7.5CMX5.7CM VISITEC

## (undated) DEVICE — PAD,EYE,1-5/8X2 5/8,STERILE,LF,1/PK: Brand: MEDLINE

## (undated) DEVICE — REVOLUTION DSP 25G ENDGRASPING FORCEPS: Brand: ALCON GRIESHABER REVOLUTION

## (undated) DEVICE — SURGICAL PROCEDURE PACK EYE CDS

## (undated) DEVICE — NEEDLE HYPO 27GA L1.25IN GRY POLYPR HUB S STL REG BVL STR

## (undated) DEVICE — (D)STRIP SKN CLSR 0.5X4IN WHT --

## (undated) DEVICE — NEEDLE HYPO 25GA L1.5IN BLU POLYPR HUB S STL REG BVL STR

## (undated) DEVICE — 1060 S-DRAPE SPCL INCISE 10/BX 4BX/CS: Brand: STERI-DRAPE™

## (undated) DEVICE — 23 GA VALVED TROCAR CANNULA ENTRY SYSTEM, 3 CT: Brand: ALCON

## (undated) DEVICE — ERASER HEMSTAT BPLR TAPR 23G -- WET-FIELD

## (undated) DEVICE — SKIN MARKER FINE TIP: Brand: DEVON

## (undated) DEVICE — 20 GA FRAGMENTATION PAK: Brand: CONSTELLATION

## (undated) DEVICE — REVOLUTION DSP 23G ILM FORCEPS: Brand: ALCON GRIESHABER REVOLUTION

## (undated) DEVICE — COVER,MAYO STAND,STERILE: Brand: MEDLINE

## (undated) DEVICE — SYR 10ML LUER LOK 1/5ML GRAD --

## (undated) DEVICE — IRRIGATION KT OPHTH 80IN --

## (undated) DEVICE — SUT VCRL 8-0 12IN TG1408 VIO --

## (undated) DEVICE — FLEXIBLE IRIS RETRACTORS, 1 UNIT AT 5 HOOKS: Brand: ALCON GRIESHABER

## (undated) DEVICE — DRAPE TOWEL: Brand: CONVERTORS

## (undated) DEVICE — SOLUTION IV STRL H2O 500 ML AQUALITE POUR BTL